# Patient Record
Sex: MALE | Race: BLACK OR AFRICAN AMERICAN | NOT HISPANIC OR LATINO | URBAN - METROPOLITAN AREA
[De-identification: names, ages, dates, MRNs, and addresses within clinical notes are randomized per-mention and may not be internally consistent; named-entity substitution may affect disease eponyms.]

---

## 2020-09-04 ENCOUNTER — EMERGENCY (EMERGENCY)
Facility: HOSPITAL | Age: 25
LOS: 1 days | Discharge: HOME | End: 2020-09-04
Admitting: EMERGENCY MEDICINE
Payer: SELF-PAY

## 2020-09-04 VITALS
DIASTOLIC BLOOD PRESSURE: 70 MMHG | SYSTOLIC BLOOD PRESSURE: 115 MMHG | HEART RATE: 89 BPM | HEIGHT: 67 IN | TEMPERATURE: 98 F | RESPIRATION RATE: 17 BRPM | WEIGHT: 175.05 LBS | OXYGEN SATURATION: 99 %

## 2020-09-04 PROCEDURE — 99282 EMERGENCY DEPT VISIT SF MDM: CPT

## 2020-09-04 PROCEDURE — 99053 MED SERV 10PM-8AM 24 HR FAC: CPT

## 2020-09-04 NOTE — ED PROVIDER NOTE - PATIENT PORTAL LINK FT
You can access the FollowMyHealth Patient Portal offered by [facility] by registering at the following website: [email or web address]. By joining [facility]’s FollowMyHealth portal, you will also be able to view your health information using other applications (apps) compatible with our system.

## 2020-09-04 NOTE — ED ADULT TRIAGE NOTE - CHIEF COMPLAINT QUOTE
Pt requesting rabies shot due to mosquito bites. Pt also requesting blood type, height measurement and "as much about my general health as I can know". Pt denies any CP, SOB, N/V, dizziness, HA, fever or chills .Pt denies any sick contacts.

## 2020-09-04 NOTE — ED ADULT NURSE NOTE - OBJECTIVE STATEMENT
26 y/o M asking for a rabies shot for mosquito bites. Pt also requesting an "AIDS test," "DNA test," blood tying, and height measurement. Pt denies any recent illness, fever, CP, SOB, N/V/D. Pt denies PMH.

## 2020-09-04 NOTE — ED PROVIDER NOTE - OBJECTIVE STATEMENT
24yo M presents today with multiple requests. requesting rabies vaccine "because I'm scared of rabies." denies any bites, bat exposure, or neuro concerns. requesting snake venom "because I was bit by a nonvenomous snake years ago." also requesting codeine-promethazine and marijuana. pt has no complaints.

## 2020-09-04 NOTE — ED ADULT NURSE NOTE - CHPI ED NUR SYMPTOMS NEG
no decreased eating/drinking/no dizziness/no pain/no vomiting/no fever/no nausea/no tingling/no weakness/no chills

## 2020-09-07 DIAGNOSIS — Z00.8 ENCOUNTER FOR OTHER GENERAL EXAMINATION: ICD-10-CM
